# Patient Record
Sex: FEMALE | Race: BLACK OR AFRICAN AMERICAN | NOT HISPANIC OR LATINO | URBAN - METROPOLITAN AREA
[De-identification: names, ages, dates, MRNs, and addresses within clinical notes are randomized per-mention and may not be internally consistent; named-entity substitution may affect disease eponyms.]

---

## 2017-05-12 ENCOUNTER — IMPORTED ENCOUNTER (OUTPATIENT)
Dept: URBAN - METROPOLITAN AREA CLINIC 1 | Facility: CLINIC | Age: 72
End: 2017-05-12

## 2017-05-12 PROBLEM — H25.813: Noted: 2017-05-12

## 2017-05-12 PROBLEM — H43.813: Noted: 2017-05-12

## 2017-05-12 PROBLEM — H40.1132: Noted: 2017-05-12

## 2017-05-12 PROBLEM — H04.123: Noted: 2017-05-12

## 2017-05-12 PROBLEM — H16.143: Noted: 2017-05-12

## 2017-05-12 PROBLEM — Z79.84: Noted: 2017-05-12

## 2017-05-12 PROBLEM — E11.9: Noted: 2017-05-12

## 2017-05-12 PROCEDURE — 92014 COMPRE OPH EXAM EST PT 1/>: CPT

## 2017-05-12 PROCEDURE — 92133 CPTRZD OPH DX IMG PST SGM ON: CPT

## 2017-11-03 ENCOUNTER — IMPORTED ENCOUNTER (OUTPATIENT)
Dept: URBAN - METROPOLITAN AREA CLINIC 1 | Facility: CLINIC | Age: 72
End: 2017-11-03

## 2017-11-03 PROBLEM — H40.1132: Noted: 2017-11-03

## 2017-11-03 PROBLEM — H18.413: Noted: 2017-11-03

## 2017-11-03 PROBLEM — H25.813: Noted: 2017-11-03

## 2017-11-03 PROBLEM — H16.143: Noted: 2017-11-03

## 2017-11-03 PROBLEM — H04.123: Noted: 2017-11-03

## 2017-11-03 PROCEDURE — 92083 EXTENDED VISUAL FIELD XM: CPT

## 2017-11-03 PROCEDURE — 92012 INTRM OPH EXAM EST PATIENT: CPT

## 2017-11-03 NOTE — PATIENT DISCUSSION
1.  Moderate Open Angle Glaucoma OU (0.7/0.75)- Stable IOP OU. Essentially normal HVF OU. Patient to continue with Latanoprost OU QHS. Patient advised to be compliant with gtts. Condition was discussed with patient and patient understands. Will continue to monitor patient for any progression in condition. Patient was advised to call us with any problems questions or concerns. 2.  Cataract OU: Observe for now without intervention. The patient was advised to contact us if any change or worsening of vision3. MARK w/ PEK OU- Stable. The continuation of artificial tears were recommended. 4.  Arcus OU- Stable. Observe. 5.  H/o PVD OU6. H/o DM w/o DR Abdulaziz Rain. Return for an appointment for a 30/OCT/glare in 6 months with Dr. Daniel Durham.

## 2018-05-10 ENCOUNTER — IMPORTED ENCOUNTER (OUTPATIENT)
Dept: URBAN - METROPOLITAN AREA CLINIC 1 | Facility: CLINIC | Age: 73
End: 2018-05-10

## 2018-05-10 PROBLEM — H16.143: Noted: 2018-05-10

## 2018-05-10 PROBLEM — H40.1132: Noted: 2018-05-10

## 2018-05-10 PROBLEM — H04.123: Noted: 2018-05-10

## 2018-05-10 PROBLEM — H43.813: Noted: 2018-05-10

## 2018-05-10 PROBLEM — H25.813: Noted: 2018-05-10

## 2018-05-10 PROBLEM — Z79.84: Noted: 2018-05-10

## 2018-05-10 PROBLEM — E11.9: Noted: 2018-05-10

## 2018-05-10 PROCEDURE — 92015 DETERMINE REFRACTIVE STATE: CPT

## 2018-05-10 PROCEDURE — 92133 CPTRZD OPH DX IMG PST SGM ON: CPT

## 2018-05-10 PROCEDURE — 92014 COMPRE OPH EXAM EST PT 1/>: CPT

## 2018-05-10 NOTE — PATIENT DISCUSSION
1.  DM Type II without sign of diabetic retinopathy and no blot heme on dilated retinal examination today OU No Macular Edema: Stable. Discussed the pathophysiology of diabetes and its effect on the eye and risk of blindness. Stressed the importance of strong glucose control. Advised of importance of at least yearly dilated examinations but to contact us immediately for any problems or concerns. 2. Type II diabetes controlled by oral medications. 3.  Moderate Open Angle Glaucoma OU (0.7/0.75)- Stable IOP and C/D OU. No progression by OCT OD. OCT looks better OS today than clinical. Patient to continue with Latanoprost OU QHS. Patient advised to be compliant with gtts. Condition was discussed with patient and patient understands. Will continue to monitor patient for any progression in condition. Patient was advised to call us with any problems questions or concerns. 4.  Cataract OU: Observe for now without intervention. The patient was advised to contact us if any change or worsening of vision5. MARK w/ PEK OU- Stable. The continuation of artificial tears were recommended. 6.  PVD OU - Old stable. 7.  Return for an appointment for a dfe/HVF/glare in 6 months with Dr. Terry Burgess.

## 2018-11-01 ENCOUNTER — IMPORTED ENCOUNTER (OUTPATIENT)
Dept: URBAN - METROPOLITAN AREA CLINIC 1 | Facility: CLINIC | Age: 73
End: 2018-11-01

## 2018-11-01 PROBLEM — Z79.84: Noted: 2018-11-01

## 2018-11-01 PROBLEM — H25.813: Noted: 2018-11-01

## 2018-11-01 PROBLEM — E11.9: Noted: 2018-11-01

## 2018-11-01 PROBLEM — H40.1132: Noted: 2018-11-01

## 2018-11-01 PROBLEM — H16.143: Noted: 2018-11-01

## 2018-11-01 PROBLEM — H43.813: Noted: 2018-11-01

## 2018-11-01 PROBLEM — H04.123: Noted: 2018-11-01

## 2018-11-01 PROCEDURE — 92083 EXTENDED VISUAL FIELD XM: CPT

## 2018-11-01 PROCEDURE — 92012 INTRM OPH EXAM EST PATIENT: CPT

## 2018-11-01 PROCEDURE — 92015 DETERMINE REFRACTIVE STATE: CPT

## 2018-11-01 NOTE — PATIENT DISCUSSION
1.  Cataract OU:  Visually Significant secondary to glare discussed the risks benefits alternatives and limitations of cataract surgery. The patient stated a full understanding. The patient will need to return for preop appointment with cataract measurements and to have any additional questions answered and start pre-operative eye drops as directed if pt desires to proceed w/sx. Diamond Milligan for pt to call if/when desires surgical intervention. Phaco/PCL OS then OD. 2. Moderate Open Angle Glaucoma OU (0.7/0.75)-Stable IOP and C/D OU. HVF normal OU. Patient to continue with Latanoprost OU QHS. Patient advised to be compliant with gtts. Condition was discussed with patient and patient understands. Will continue to monitor patient for any progression in condition. Patient was advised to call us with any problems questions or concerns. 3.  MARK w/ PEK OU- Controlled. The continuation of artificial tears were recommended. 4.  DM Type II without sign of diabetic retinopathy and no blot heme on dilated retinal examination today OU No Macular Edema: Stable. 5.  Type II diabetes controlled by oral medications. 6.  PVD w/o Tear OU- Old stable. 7. Return for an appointment for 30/OCT/glare in 6 months with Dr. Mohan German.

## 2018-11-01 NOTE — PATIENT DISCUSSION
PVD w/o Tear OU - Patient was cautioned to call our office immediately if they experience   a substantial change in their symptoms such as an increase in floaters persistent flashes loss of visual field (may appear as a shadow or a curtain) or decrease in visual acuity as these may indicate a retinal tear or detachment.

## 2019-01-04 ENCOUNTER — IMPORTED ENCOUNTER (OUTPATIENT)
Dept: URBAN - METROPOLITAN AREA CLINIC 1 | Facility: CLINIC | Age: 74
End: 2019-01-04

## 2019-01-04 PROBLEM — H25.812: Noted: 2019-01-04

## 2019-01-04 PROBLEM — H40.1122: Noted: 2019-01-04

## 2019-01-04 PROCEDURE — 92136 OPHTHALMIC BIOMETRY: CPT

## 2019-01-04 NOTE — PATIENT DISCUSSION
Cataract OS: Visually Significant secondary to glare discussed the risks benefits alternatives and limitations of cataract surgery. The patient stated a full understanding and a desire to proceed with the procedure. The patient will need to start pre-operative eye drops as directed. **Myopic goal.Proceed w/ phaco PCL OS w/ Standard lens w/ LenSx procedure and iStentPt understands they will need glasses post-op to achieve their best corrected vision. Return for an appointment for sx OS with Dr. Ashly Ray.

## 2019-01-09 ENCOUNTER — IMPORTED ENCOUNTER (OUTPATIENT)
Dept: URBAN - METROPOLITAN AREA CLINIC 1 | Facility: CLINIC | Age: 74
End: 2019-01-09

## 2019-01-10 ENCOUNTER — IMPORTED ENCOUNTER (OUTPATIENT)
Dept: URBAN - METROPOLITAN AREA CLINIC 1 | Facility: CLINIC | Age: 74
End: 2019-01-10

## 2019-01-10 PROBLEM — Z96.1: Noted: 2019-01-10

## 2019-01-10 PROCEDURE — 99024 POSTOP FOLLOW-UP VISIT: CPT

## 2019-01-10 NOTE — PATIENT DISCUSSION
POD#1 CE/IOL OS (Standard w/ LenSx iStent) doing well. *Myopic Goal* Continue all 3 gtts as prescribed and until gone. Use Prednisolone BID OS Acular Qdaily OS Ocuflox TID OS Cont Latanoprost QHS OU Post op Warnings Reiterated RTC as scheduled

## 2019-01-15 ENCOUNTER — IMPORTED ENCOUNTER (OUTPATIENT)
Dept: URBAN - METROPOLITAN AREA CLINIC 1 | Facility: CLINIC | Age: 74
End: 2019-01-15

## 2019-01-15 PROBLEM — H25.811: Noted: 2019-01-15

## 2019-01-15 PROCEDURE — 92136 OPHTHALMIC BIOMETRY: CPT

## 2019-01-15 NOTE — PATIENT DISCUSSION
1.  Cataract OD: Visually Significant secondary to glare discussed the risks benefits alternatives and limitations of cataract surgery. The patient stated a full understanding and a desire to proceed with the procedure. Pt understands they will need glasses post-op to achieve their best corrected vision. Phaco PCL OD (Standard w/ LenSx iStent) *Myopic Goal*2. POW#2  CE/IOL OS (Standard w/ LenSx iStent) doing well.  *Myopic Goal* Use Prednisolone BID OS till out Use Acular Qdaily OS till out Cont Latanoprost QHS OU F/u as scheduled 2nd eye

## 2019-01-23 ENCOUNTER — IMPORTED ENCOUNTER (OUTPATIENT)
Dept: URBAN - METROPOLITAN AREA CLINIC 1 | Facility: CLINIC | Age: 74
End: 2019-01-23

## 2019-01-24 ENCOUNTER — IMPORTED ENCOUNTER (OUTPATIENT)
Dept: URBAN - METROPOLITAN AREA CLINIC 1 | Facility: CLINIC | Age: 74
End: 2019-01-24

## 2019-01-24 PROBLEM — Z96.1: Noted: 2019-01-24

## 2019-01-24 PROCEDURE — 99024 POSTOP FOLLOW-UP VISIT: CPT

## 2019-01-24 NOTE — PATIENT DISCUSSION
1. POD#1 CE/IOL Standard/LenSx w/ iStent OD doing well. Stable IOP. ***Myopic Goal. Continue all 3 gtts as prescribed and until gone. Post op Warnings Reiterated 2. POW#1  CE/IOL  Standard/LenSx w/ iStent OS doing well. Stable IOPDiscontinue OcufloxContinue Lotemax/Durezol/Prednisolone BID until gone. Continue Prolensa/Ilevro/Acular QD until gone. 3.   RTC as scheduled

## 2019-02-18 ENCOUNTER — IMPORTED ENCOUNTER (OUTPATIENT)
Dept: URBAN - METROPOLITAN AREA CLINIC 1 | Facility: CLINIC | Age: 74
End: 2019-02-18

## 2019-02-18 PROBLEM — Z96.1: Noted: 2019-02-18

## 2019-02-18 PROCEDURE — 99024 POSTOP FOLLOW-UP VISIT: CPT

## 2019-02-18 NOTE — PATIENT DISCUSSION
1. POW#3 CE/IOL Standard/LenSx w/ iStent OD doing well. Continue gtts as prescribed and until gone. Switch ATs to PF ATs OU.2.  POW#5  CE/IOL  Standard/LenSx w/ iStent OS doing well. Continue Latanoprost QHS OU. MRX given for glasses. Return for an appointment in 4 months for 30 / OCT with Dr. Bob Clemente.

## 2019-06-27 ENCOUNTER — IMPORTED ENCOUNTER (OUTPATIENT)
Dept: URBAN - METROPOLITAN AREA CLINIC 1 | Facility: CLINIC | Age: 74
End: 2019-06-27

## 2019-06-27 PROBLEM — Z96.1: Noted: 2019-06-27

## 2019-06-27 PROBLEM — H43.813: Noted: 2019-06-27

## 2019-06-27 PROBLEM — H04.123: Noted: 2019-06-27

## 2019-06-27 PROBLEM — H40.1132: Noted: 2019-06-27

## 2019-06-27 PROBLEM — Z79.84: Noted: 2019-06-27

## 2019-06-27 PROBLEM — E11.9: Noted: 2019-06-27

## 2019-06-27 PROBLEM — H16.143: Noted: 2019-06-27

## 2019-06-27 PROCEDURE — 92014 COMPRE OPH EXAM EST PT 1/>: CPT

## 2019-06-27 PROCEDURE — 92133 CPTRZD OPH DX IMG PST SGM ON: CPT

## 2019-06-27 NOTE — PATIENT DISCUSSION
1.  DM Type II (Oral Meds) without sign of diabetic retinopathy and no blot heme on dilated retinal examination today OU No Macular Edema:  Discussed the pathophysiology of diabetes and its effect on the eye and risk of blindness. Stressed the importance of strong glucose control. Advised of importance of at least yearly dilated examinations but to contact us immediately for any problems or concerns. 2. Moderate Open Angle Glaucoma OU (CD 0.65/0.8) IOP stable OU OCT shows no progression OU. Clinical picture more advanced than OCT testing. Cont Latanoprost QHS OU. Compliance with gtts. 3.  PVD OU - RD precautions. 4.  MARK w/ PEK OU- Use ATs TID OU Routinely. 5.  Pseudophakia OU - (Standard OU w/ iStent LenSx OU) Myopic Goal Letter to PCP MRx deferred Return for an appointment in 6 mo 10 VF 24-2 OU with Dr. Bela Mayfield.

## 2020-01-16 ENCOUNTER — IMPORTED ENCOUNTER (OUTPATIENT)
Dept: URBAN - METROPOLITAN AREA CLINIC 1 | Facility: CLINIC | Age: 75
End: 2020-01-16

## 2020-01-16 PROBLEM — H40.1132: Noted: 2020-01-16

## 2020-01-16 PROCEDURE — 92083 EXTENDED VISUAL FIELD XM: CPT

## 2020-01-16 PROCEDURE — 92012 INTRM OPH EXAM EST PATIENT: CPT

## 2020-01-16 NOTE — PATIENT DISCUSSION
MARK w/ PEK OU- Use ATs TID OU Routinely.  5.  Pseudophakia OU - (Standard OU w/ iStent LenSx OU) Myopic Goal

## 2020-07-23 ENCOUNTER — IMPORTED ENCOUNTER (OUTPATIENT)
Dept: URBAN - METROPOLITAN AREA CLINIC 1 | Facility: CLINIC | Age: 75
End: 2020-07-23

## 2020-07-23 PROBLEM — H16.143: Noted: 2020-07-23

## 2020-07-23 PROBLEM — H43.813: Noted: 2020-07-23

## 2020-07-23 PROBLEM — H26.492: Noted: 2020-07-23

## 2020-07-23 PROBLEM — E11.9: Noted: 2020-07-23

## 2020-07-23 PROBLEM — Z79.84: Noted: 2020-07-23

## 2020-07-23 PROBLEM — H04.123: Noted: 2020-07-23

## 2020-07-23 PROBLEM — Z79.4: Noted: 2020-07-23

## 2020-07-23 PROBLEM — H40.1132: Noted: 2020-07-23

## 2020-07-23 PROBLEM — Z96.1: Noted: 2020-07-23

## 2020-07-23 PROCEDURE — 92133 CPTRZD OPH DX IMG PST SGM ON: CPT

## 2020-07-23 PROCEDURE — 92014 COMPRE OPH EXAM EST PT 1/>: CPT

## 2020-07-23 NOTE — PATIENT DISCUSSION
1.  DM Type II (Insulin) without sign of diabetic retinopathy and no blot heme on dilated retinal examination today OU No Macular Edema:  Discussed the pathophysiology of diabetes and its effect on the eye and risk of blindness. Stressed the importance of strong glucose control. Advised of importance of at least yearly dilated examinations but to contact us immediately for any problems or concerns. 2. Moderate Open Angle Glaucoma OU (CD 0.65/0.80) - Slight progression shown on OCT. IOP stable Will begin monotrial Alphagan P BID OD only (sample given). Cont Latanoprost QHS OU. Patient advised to be compliant with gtts. Condition was discussed with patient and patient understands. Will continue to monitor patient for any progression in condition. Patient was advised to call us with any problems questions or concerns. 3.  MARK w/ PEK OU- Recommend ATs TID OU routinely 4. PCO  OS: (Posterior Capsule Opacification)   Observe and consider yag cap when pt feels pco visually significant and visual acuity decreases to appropriate level. 5. Pseudophakia OU - (Standard/LenSx w/ iStent OU). *Myopic Goal. 6.  PVD OU - RD precautions. Patient defers the refraction at today's visitReturn for an appointment in 1 month 10 (IOP check on monotrial) with Dr. Adelaida Garcia.

## 2020-08-24 ENCOUNTER — IMPORTED ENCOUNTER (OUTPATIENT)
Dept: URBAN - METROPOLITAN AREA CLINIC 1 | Facility: CLINIC | Age: 75
End: 2020-08-24

## 2020-08-24 PROBLEM — H40.1132: Noted: 2020-08-24

## 2020-08-24 PROCEDURE — 99213 OFFICE O/P EST LOW 20 MIN: CPT

## 2020-08-24 NOTE — PATIENT DISCUSSION
MARK w/ PEK OU- Use ATs TID OU Routinely. 3.  PCO OS - Observe 4. Pseudophakia OU - (Standard OU w/ iStent LenSx OU) Myopic Goal 5. H/o DM w/o DR OU 6. H/o PVD OUReturn for an appointment in January 10/HVF with Dr. Mohan German.

## 2020-08-24 NOTE — PATIENT DISCUSSION
1.  Moderate Open Angle Glaucoma OU (CD 0.65/0.80) - IOP improved on monotrial will now use Alphagan P BID OU (erx) and cont Latanoprost QHS OU. Patient advised to be compliant with gtts. 2.  MARK w/ PEK OU- Use ATs TID OU Routinely. 3.  PCO OS - Observe 4. Pseudophakia OU - (Standard OU w/ iStent LenSx OU) Myopic Goal 5. H/o DM w/o DR OU 6. H/o PVD OUReturn for an appointment in January 10/HVF with Dr. Arlene Mora.

## 2021-01-07 ENCOUNTER — IMPORTED ENCOUNTER (OUTPATIENT)
Dept: URBAN - METROPOLITAN AREA CLINIC 1 | Facility: CLINIC | Age: 76
End: 2021-01-07

## 2021-01-07 PROBLEM — H10.45: Noted: 2021-01-07

## 2021-01-07 PROBLEM — H01.115: Noted: 2021-01-07

## 2021-01-07 PROBLEM — H01.112: Noted: 2021-01-07

## 2021-01-07 PROBLEM — H40.1132: Noted: 2021-01-07

## 2021-01-07 PROCEDURE — 92012 INTRM OPH EXAM EST PATIENT: CPT

## 2021-01-07 PROCEDURE — 92083 EXTENDED VISUAL FIELD XM: CPT

## 2021-01-07 NOTE — PATIENT DISCUSSION
1.  Moderate Open Angle Glaucoma OU (CD 0.65/0.80) - Slight progression shown on HVF OU. IOP stable. Likely allergy to Alphagan P will DC. Continue Latanoprost QHS OU. Will likely trial Cosopt. Patient advised to be compliant with gtts. 2.  Allergic Dermatitis OU - Begin Maxitrol QHS OU (erx) 3. Allergic Conjunctivitis OU - The condition was  discussed with the patient. Avoidance of allergens and cool compresses were recommended. 4. MARK w/ PEK OU- Use ATs TID OU Routinely. 5.  PCO OS - Observe 6. Pseudophakia OU - (Standard OU w/ iStent LenSx OU) Myopic Goal 7. H/o DM w/o  OU 8. H/o PVD OUReturn for an appointment in 6 weeks 10 with Dr. Claudeen Cobble.

## 2021-02-25 ENCOUNTER — IMPORTED ENCOUNTER (OUTPATIENT)
Dept: URBAN - METROPOLITAN AREA CLINIC 1 | Facility: CLINIC | Age: 76
End: 2021-02-25

## 2021-02-25 PROBLEM — H40.1132: Noted: 2021-02-25

## 2021-02-25 PROCEDURE — 99213 OFFICE O/P EST LOW 20 MIN: CPT

## 2021-02-25 NOTE — PATIENT DISCUSSION
1.  Moderate Open Angle Glaucoma OU (CD 0.65/0.80) - IOP slightly elevated since DC Alphagan. Begin Trusopt BID OU (erx). Continue Latanoprost QHS OU. Patient advised to be compliant with gtts. 2.  Allergic Dermatitis OU - Resolved DC Maxitrol Agnieszka  3. Allergic Conjunctivitis OU - The condition was  discussed with the patient. Avoidance of allergens and cool compresses were recommended. 4. MARK w/ PEK OU- Use ATs TID OU Routinely. 5.  PCO OS - Observe 6. Pseudophakia OU - (Standard OU w/ iStent LenSx OU) Myopic Goal 7. H/o DM w/o DR OU 8. H/o PVD OUReturn for an appointment in 3 months 10 with Dr. Jose J Fish.

## 2021-02-25 NOTE — PATIENT DISCUSSION
(CD 0.65/0.80) - IOP slightly elevated since DC Alphagan. Begin Trusopt BID OU (erx). Continue Latanoprost QHS OU. Patient advised to be compliant with gtts. 2.  Allergic Dermatitis OU - Resolved DC Maxitrol Agnieszka  3. Allergic Conjunctivitis OU - The condition was  discussed with the patient. Avoidance of allergens and cool compresses were recommended. 4. MARK w/ PEK OU- Use ATs TID OU Routinely. 5.  PCO OS - Observe 6. Pseudophakia OU - (Standard OU w/ iStent LenSx OU) Myopic Goal 7. H/o DM w/o DR OU 8. H/o PVD OUReturn for an appointment in 3 months 10 with Dr. Cherie Yanez.

## 2021-05-20 ENCOUNTER — IMPORTED ENCOUNTER (OUTPATIENT)
Dept: URBAN - METROPOLITAN AREA CLINIC 1 | Facility: CLINIC | Age: 76
End: 2021-05-20

## 2021-05-20 PROBLEM — H40.1132: Noted: 2021-05-20

## 2021-05-20 PROCEDURE — 99213 OFFICE O/P EST LOW 20 MIN: CPT

## 2021-05-20 NOTE — PATIENT DISCUSSION
1.  Moderate Open Angle Glaucoma OU (CD 0.65/0.80) - IOP good result since switching to Trusopt and symptoms improved since d/c Alphagan. Cont Trusopt BID OU and Latanoprost QHS OU. Patient advised to be compliant with gtts. 2.  Allergic Conjunctivitis OU - Resolved. Likely Brimonidine component of Alphagan. Symptoms have resolved since d/c Alphagan. 4.  MARK w/ PEK OU -- Use ATs TID OU Routinely. 5.  PCO OS -- Observe 6. Pseudophakia OU -- (Standard OU w/ iStent LenSx OU) Myopic Goal 7. H/o DM w/o DR OU 8. H/o PVD OUReturn for an appointment 3-4 months for a 30/OCT/glare OS with Dr. Claudeen Cobble.

## 2021-10-07 ENCOUNTER — IMPORTED ENCOUNTER (OUTPATIENT)
Dept: URBAN - METROPOLITAN AREA CLINIC 1 | Facility: CLINIC | Age: 76
End: 2021-10-07

## 2021-10-07 PROBLEM — H40.1132: Noted: 2021-10-07

## 2021-10-07 PROBLEM — H26.492: Noted: 2021-10-07

## 2021-10-07 PROBLEM — E11.9: Noted: 2021-10-07

## 2021-10-07 PROCEDURE — 92133 CPTRZD OPH DX IMG PST SGM ON: CPT

## 2021-10-07 PROCEDURE — 99214 OFFICE O/P EST MOD 30 MIN: CPT

## 2021-10-07 NOTE — PATIENT DISCUSSION
1.  DM Type II (Oral Med) without sign of diabetic retinopathy and no blot heme on dilated retinal examination today OU No Macular Edema:  Discussed the pathophysiology of diabetes and its effect on the eye and risk of blindness. Stressed the importance of strong glucose control. Advised of importance of at least yearly dilated examinations but to contact us immediately for any problems or concerns. 2. Moderate Open Angle Glaucoma OU (CD 0.65/0.80) - IOP stable OU. OCT today stable OD decreased RNFL OS however signal strength poorer. Cont Trusopt BID OU and Latanoprost QHS OU. Patient advised to be compliant with gtts. *H/o allergy to Alphagan. Escribed dorzolamide to [facility] @ pt request.3. PCO OS- Visually Significant *secondary to glare*; schedule YAG Cap. Pros cons risks and benefits. 4.  Allergic Conjunctivitis OU - Resolved. Likely Brimonidine component of Alphagan. Symptoms have resolved since d/c Alphagan. 5.  MARK w/ PEK OU -- Use ATs TID OU Routinely. 6.  Pseudophakia OU -- (Standard OU w/ iStent LenSx OU) Myopic Goal 7. H/o PVD OUReturn for an appointment YAG Cap OS Only with Dr. Rajat Pierce.

## 2021-11-05 ENCOUNTER — IMPORTED ENCOUNTER (OUTPATIENT)
Dept: URBAN - METROPOLITAN AREA CLINIC 1 | Facility: CLINIC | Age: 76
End: 2021-11-05

## 2021-11-05 PROBLEM — H26.492: Noted: 2021-11-05

## 2021-11-05 PROCEDURE — 66821 AFTER CATARACT LASER SURGERY: CPT

## 2021-12-13 ENCOUNTER — IMPORTED ENCOUNTER (OUTPATIENT)
Dept: URBAN - METROPOLITAN AREA CLINIC 1 | Facility: CLINIC | Age: 76
End: 2021-12-13

## 2021-12-13 PROBLEM — Z09: Noted: 2021-12-13

## 2021-12-13 PROCEDURE — 99024 POSTOP FOLLOW-UP VISIT: CPT

## 2021-12-13 NOTE — PATIENT DISCUSSION
PO YAG Cap OS: good result. MRX given. Return for an appointment in April for a 10/HVF 24-2 with Dr. Aquilino Cedillo.

## 2022-04-02 ASSESSMENT — TONOMETRY
OS_IOP_MMHG: 18
OD_IOP_MMHG: 18
OS_IOP_MMHG: 16
OS_IOP_MMHG: 17
OD_IOP_MMHG: 13
OS_IOP_MMHG: 18
OS_IOP_MMHG: 18
OD_IOP_MMHG: 17
OD_IOP_MMHG: 14
OD_IOP_MMHG: 14
OS_IOP_MMHG: 16
OS_IOP_MMHG: 17
OS_IOP_MMHG: 14
OD_IOP_MMHG: 17
OD_IOP_MMHG: 17
OD_IOP_MMHG: 12
OD_IOP_MMHG: 17
OD_IOP_MMHG: 18
OD_IOP_MMHG: 16
OS_IOP_MMHG: 13
OS_IOP_MMHG: 14
OS_IOP_MMHG: 15
OS_IOP_MMHG: 17
OS_IOP_MMHG: 16
OS_IOP_MMHG: 14
OD_IOP_MMHG: 18
OD_IOP_MMHG: 17
OS_IOP_MMHG: 18
OD_IOP_MMHG: 14
OS_IOP_MMHG: 17
OS_IOP_MMHG: 19
OD_IOP_MMHG: 18
OD_IOP_MMHG: 17

## 2022-04-02 ASSESSMENT — VISUAL ACUITY
OS_SC: 20/20
OS_CC: 20/100
OD_SC: 20/20
OS_SC: 20/20
OD_CC: J1
OD_GLARE: 20/70
OS_SC: 20/25
OS_GLARE: 20/50
OS_GLARE: 20/50
OD_CC: 20/40
OD_SC: 20/20
OD_SC: 20/20
OS_CC: 20/150
OS_PH: SC 20/25
OS_SC: 20/20
OD_SC: 20/20
OD_SC: 20/25
OS_CC: J1
OS_GLARE: 20/70
OD_SC: 20/20
OS_CC: J1
OD_SC: 20/20
OD_SC: 20/20
OD_PH: SC 20/25
OS_SC: 20/20
OS_SC: 20/20
OD_CC: J1
OD_GLARE: 20/50
OD_SC: 20/20
OS_GLARE: 20/70
OD_GLARE: 20/50
OS_SC: 20/25
OS_CC: J2
OS_SC: 20/20
OS_SC: 20/20
OS_GLARE: 20/60
OS_SC: 20/20
OD_SC: 20/20-1
OD_SC: J1
OS_SC: 20/20
OS_SC: 20/20-1
OD_CC: J1
OD_GLARE: 20/40
OD_SC: J1
OS_CC: 20/80
OD_SC: 20/20
OD_CC: 20/40
OS_SC: 20/20
OD_SC: 20/20-1
OS_SC: J1
OS_SC: J2

## 2022-04-21 ENCOUNTER — FOLLOW UP (OUTPATIENT)
Dept: URBAN - METROPOLITAN AREA CLINIC 1 | Facility: CLINIC | Age: 77
End: 2022-04-21

## 2022-04-21 DIAGNOSIS — H40.1132: ICD-10-CM

## 2022-04-21 PROCEDURE — 99213 OFFICE O/P EST LOW 20 MIN: CPT

## 2022-04-21 PROCEDURE — 92083 EXTENDED VISUAL FIELD XM: CPT

## 2022-04-21 ASSESSMENT — VISUAL ACUITY
OS_CC: 20/20
OD_CC: 20/20

## 2022-04-21 ASSESSMENT — TONOMETRY
OD_IOP_MMHG: 18
OS_IOP_MMHG: 17

## 2022-04-21 NOTE — PATIENT DISCUSSION
PO YAG Cap OS: good result. MRX given. Return for an appointment in April for a 10/HVF 24-2 with Dr. Gregorio Quijano.

## 2022-04-21 NOTE — PATIENT DISCUSSION
(CD: 0.65, 0.80) - IOP stable 18/17 today. HVF w/o progression OU. Cont Latanoprost QHS OU and Dorzolamide BID OU.

## 2022-10-20 ENCOUNTER — COMPREHENSIVE EXAM (OUTPATIENT)
Dept: URBAN - METROPOLITAN AREA CLINIC 1 | Facility: CLINIC | Age: 77
End: 2022-10-20

## 2022-10-20 DIAGNOSIS — Z96.1: ICD-10-CM

## 2022-10-20 DIAGNOSIS — H16.143: ICD-10-CM

## 2022-10-20 DIAGNOSIS — H40.1132: ICD-10-CM

## 2022-10-20 DIAGNOSIS — E11.9: ICD-10-CM

## 2022-10-20 DIAGNOSIS — H04.123: ICD-10-CM

## 2022-10-20 DIAGNOSIS — H43.813: ICD-10-CM

## 2022-10-20 PROCEDURE — 92133 CPTRZD OPH DX IMG PST SGM ON: CPT

## 2022-10-20 PROCEDURE — 99214 OFFICE O/P EST MOD 30 MIN: CPT

## 2022-10-20 ASSESSMENT — TONOMETRY
OD_IOP_MMHG: 16
OS_IOP_MMHG: 16

## 2022-10-20 NOTE — PATIENT DISCUSSION
(CD: 0.65, 0.80) - IOP stable at 16 OU today. OCT ON performed today shows no progression OU and is sable. Advised the patient to continue latanoprost QHS OU and dorzolamide BID OU ( both erx'd).

## 2022-11-03 ENCOUNTER — EMERGENCY VISIT (OUTPATIENT)
Dept: URBAN - METROPOLITAN AREA CLINIC 1 | Facility: CLINIC | Age: 77
End: 2022-11-03

## 2022-11-03 DIAGNOSIS — H00.032: ICD-10-CM

## 2022-11-03 PROCEDURE — 99213 OFFICE O/P EST LOW 20 MIN: CPT

## 2022-11-03 RX ORDER — CEPHALEXIN 500 MG/1: 1 CAPSULE ORAL TWICE A DAY

## 2022-11-03 ASSESSMENT — VISUAL ACUITY
OS_CC: 20/20
OD_CC: 20/20

## 2022-11-03 ASSESSMENT — TONOMETRY
OS_IOP_MMHG: 16
OD_IOP_MMHG: 16

## 2022-11-03 NOTE — PATIENT DISCUSSION
(CD: 0.65, 0.80) - IOP stable at 16 OU today. Advised the patient to continue latanoprost QHS OU and dorzolamide BID OU ( both erx'd).

## 2022-11-03 NOTE — PATIENT DISCUSSION
Begin hot compresses TID x 5 minutes. Begin Keflex 500mg PO BID x 10 days. (Erx'd) If without improvement discussed with patient possible Incision and Drainage procedure at next visit. Risks and benefits discussed with patient and patient states full understanding.

## 2022-11-07 ENCOUNTER — CLINIC PROCEDURE ONLY (OUTPATIENT)
Dept: URBAN - METROPOLITAN AREA CLINIC 1 | Facility: CLINIC | Age: 77
End: 2022-11-07

## 2022-11-07 DIAGNOSIS — H00.032: ICD-10-CM

## 2022-11-07 PROCEDURE — 67700 BLEPHAROTOMY DRG ABSC EYELID: CPT

## 2022-11-07 NOTE — PROCEDURE NOTE: CLINICAL
PROCEDURE NOTE: Excision Chalazion, Single Right Lower Lid. Diagnosis: Abscess of Eyelid. Anesthesia: Subconjunctival Lidocaine. Prep: Betadine Scrub. Prior to treatment, the risks/benefits/alternatives were discussed. The patient wished to proceed with procedure. 2% Lidocaine was injected into the right lower eyelid and the lid was prepped with an alcohol swab. Betadine was used to clean the area around the eye. The lid was clamped using a chalazion clamp exposing the posterior surface on the eyelid. The chalazion was incised with a #15 blade and the contents were removed with a curette. The clamp was removed and maxitrol ointment was instilled. A pressure patch was placed over the eye. The patient tolerated the procedure well and there were no complications. Post procedure instructions given. Edel William

## 2022-11-07 NOTE — PATIENT DISCUSSION
Continue hot compresses TID x 5 minutes. Continue Keflex 500mg PO BID x 10 days. If without improvement discussed with patient possible Incision and Drainage procedure at next visit. Risks and benefits discussed with patient and patient states full understanding.

## 2022-11-24 NOTE — PATIENT DISCUSSION
YAG CAP OS: (Consent signed and scanned into attachments) 1 gtt Prolensa applied. The purpose and nature of the procedure possible alternative methods of treatment the risks involved and the possibility of complications were discussed with patient. The Patient wishes to proceed and the consent was signed. The laser was then performed under topical anesthesia with no complications. Post op instructions were given to patient as well as a follow-up appointment. Patient was advised to call our office if any questions or concerns. Return for an appointment in 1-4 week PO/YAG with Dr. Rajat Pierce.
Opt out

## 2023-04-27 ENCOUNTER — FOLLOW UP (OUTPATIENT)
Dept: URBAN - METROPOLITAN AREA CLINIC 1 | Facility: CLINIC | Age: 78
End: 2023-04-27

## 2023-04-27 DIAGNOSIS — H40.1132: ICD-10-CM

## 2023-04-27 PROCEDURE — 92083 EXTENDED VISUAL FIELD XM: CPT

## 2023-04-27 PROCEDURE — 99213 OFFICE O/P EST LOW 20 MIN: CPT

## 2023-04-27 ASSESSMENT — VISUAL ACUITY
OD_CC: 20/20
OS_CC: 20/20

## 2023-04-27 ASSESSMENT — TONOMETRY
OD_IOP_MMHG: 16
OS_IOP_MMHG: 16

## 2023-11-02 ENCOUNTER — COMPREHENSIVE EXAM (OUTPATIENT)
Dept: URBAN - METROPOLITAN AREA CLINIC 1 | Facility: CLINIC | Age: 78
End: 2023-11-02

## 2023-11-02 DIAGNOSIS — H16.143: ICD-10-CM

## 2023-11-02 DIAGNOSIS — H04.123: ICD-10-CM

## 2023-11-02 DIAGNOSIS — E11.9: ICD-10-CM

## 2023-11-02 DIAGNOSIS — H40.1132: ICD-10-CM

## 2023-11-02 PROCEDURE — 92133 CPTRZD OPH DX IMG PST SGM ON: CPT

## 2023-11-02 PROCEDURE — 99214 OFFICE O/P EST MOD 30 MIN: CPT

## 2023-11-02 RX ORDER — DORZOLAMIDE HYDROCHLORIDE AND TIMOLOL MALEATE 20; 5 MG/ML; MG/ML
1 SOLUTION/ DROPS OPHTHALMIC TWICE A DAY
Start: 2023-11-02

## 2023-11-02 ASSESSMENT — VISUAL ACUITY
OS_CC: J5
OS_CC: 20/20
OD_CC: 20/20-1
OD_CC: J5

## 2023-11-02 ASSESSMENT — TONOMETRY
OS_IOP_MMHG: 16
OD_IOP_MMHG: 16

## 2024-03-24 NOTE — PATIENT DISCUSSION
1.  Moderate Open Angle Glaucoma OU (CD 0.65/0.80) - HVF shows early arcuate vs artifact OD WNL OS. IOP stable cont Latanoprost QHS OU. Patient advised to be compliant with gtts. 2. MARK w/ PEK OU- Use ATs TID OU Routinely. 3.  PCO OS - Observe 4. Pseudophakia OU - (Standard OU w/ iStent LenSx OU) Myopic Goal 5. H/o DM w/o DR OU 6. H/o PVD OUReturn for an appointment in 6 months 30/OCT with Dr. Arianna Kearney. show

## 2024-03-29 ENCOUNTER — FOLLOW UP (OUTPATIENT)
Dept: URBAN - METROPOLITAN AREA CLINIC 1 | Facility: CLINIC | Age: 79
End: 2024-03-29

## 2024-03-29 DIAGNOSIS — H40.1132: ICD-10-CM

## 2024-03-29 PROCEDURE — 92083 EXTENDED VISUAL FIELD XM: CPT

## 2024-03-29 PROCEDURE — 99213 OFFICE O/P EST LOW 20 MIN: CPT

## 2024-03-29 ASSESSMENT — TONOMETRY
OD_IOP_MMHG: 14
OS_IOP_MMHG: 15

## 2024-03-29 ASSESSMENT — VISUAL ACUITY
OS_CC: 20/20
OD_CC: 20/20

## 2024-10-28 ENCOUNTER — COMPREHENSIVE EXAM (OUTPATIENT)
Dept: URBAN - METROPOLITAN AREA CLINIC 1 | Facility: CLINIC | Age: 79
End: 2024-10-28

## 2024-10-28 DIAGNOSIS — H04.123: ICD-10-CM

## 2024-10-28 DIAGNOSIS — H43.813: ICD-10-CM

## 2024-10-28 DIAGNOSIS — H40.1132: ICD-10-CM

## 2024-10-28 DIAGNOSIS — H16.143: ICD-10-CM

## 2024-10-28 DIAGNOSIS — E11.9: ICD-10-CM

## 2024-10-28 DIAGNOSIS — Z96.1: ICD-10-CM

## 2024-10-28 PROCEDURE — 99214 OFFICE O/P EST MOD 30 MIN: CPT

## 2024-10-28 PROCEDURE — 92133 CPTRZD OPH DX IMG PST SGM ON: CPT

## 2025-05-02 ENCOUNTER — FOLLOW UP (OUTPATIENT)
Age: 80
End: 2025-05-02

## 2025-05-02 DIAGNOSIS — H40.1132: ICD-10-CM

## 2025-05-02 PROCEDURE — 92083 EXTENDED VISUAL FIELD XM: CPT

## 2025-05-02 PROCEDURE — 99213 OFFICE O/P EST LOW 20 MIN: CPT
